# Patient Record
(demographics unavailable — no encounter records)

---

## 2025-03-19 NOTE — ADDENDUM
[FreeTextEntry1] : I, Roldan Trujillo, documented this note as a scribe on behalf of Davidson Jara MD on 03/19/2025.

## 2025-03-19 NOTE — DISCUSSION/SUMMARY
[de-identified] : We discussed further treatment options. Symptoms are slowly improving. Restrictions were reviewed. She will try a brace in addition to anti-inflammatory medications. She will let me know of any changing or worsening of her symptoms.

## 2025-03-19 NOTE — PHYSICAL EXAM
[de-identified] : Examination of the lumbar spine reveals no midline tenderness palpation, step-offs, or skin lesions. Decreased range of motion with respect to flexion, extension, lateral bending, and rotation. No tenderness to palpation of the sciatic notch. No tenderness palpation of the bilateral greater trochanters. No pain with passive internal/external rotation of the hips. No instability of bilateral lower extremities.  Negative JESSI. Negative straight leg raise bilaterally. No bowstring. Negative femoral stretch. 5 out of 5 iliopsoas, hip abductors, hips adductors, quadriceps, hamstrings, gastrocsoleus, tibialis anterior, extensor hallucis longus, peroneals. Grossly intact sensation to light touch bilateral lower extremities. 1+ patellar and Achilles reflexes. Downgoing Babinski. No clonus. Intact proprioception. Palpable pulses. No skin lesion and no edema on the right and left lower extremities. [de-identified] : Review of her CT scan shows left-sided L2 and L3 transverse process fractures.

## 2025-03-19 NOTE — END OF VISIT
[FreeTextEntry3] : All medical record entries made by the Scribe were at my, Davidson Jara MD, direction and personally dictated by me on 03/19/2025. I have reviewed the chart and agree that the record accurately reflects my personal performance of the history, physical exam, assessment and plan. I have also personally directed, reviewed, and agreed with the chart. [Time Spent: ___ minutes] : I have spent [unfilled] minutes of time on the encounter which excludes teaching and separately reported services.

## 2025-03-19 NOTE — HISTORY OF PRESENT ILLNESS
[de-identified] : Ms. CRYSTAL ENGEL  is a 30 year old female who presents with left lumbar pain since Saturday when she fell on bleachers.  She went to the ER yesterday and was diagnosed with a L2 and L3 TP fracture.  Denies any LE radicular symptoms.  Normal bowel and bladder control.   Denies any recent fevers, chills, sweats, weight loss, or infection.  She has used a lidocaine patch, heat, nsaids, and some oxycodone at night with mild relief.   The patients past medical history, past surgical history, medications, allergies, and social history were reviewed by me today with the patient and documented accordingly.  In addition, the patient's family history, which is noncontributory to their visit, was also reviewed.